# Patient Record
Sex: MALE | Race: OTHER
[De-identification: names, ages, dates, MRNs, and addresses within clinical notes are randomized per-mention and may not be internally consistent; named-entity substitution may affect disease eponyms.]

---

## 2019-10-17 ENCOUNTER — HOSPITAL ENCOUNTER (INPATIENT)
Dept: HOSPITAL 74 - JER | Age: 44
LOS: 6 days | Discharge: HOME | DRG: 347 | End: 2019-10-23
Attending: NURSE PRACTITIONER | Admitting: INTERNAL MEDICINE
Payer: COMMERCIAL

## 2019-10-17 VITALS — BODY MASS INDEX: 34.3 KG/M2

## 2019-10-17 DIAGNOSIS — R26.2: ICD-10-CM

## 2019-10-17 DIAGNOSIS — R73.9: ICD-10-CM

## 2019-10-17 DIAGNOSIS — D72.829: ICD-10-CM

## 2019-10-17 DIAGNOSIS — M54.5: ICD-10-CM

## 2019-10-17 DIAGNOSIS — M54.16: Primary | ICD-10-CM

## 2019-10-17 DIAGNOSIS — G62.9: ICD-10-CM

## 2019-10-17 LAB
ALBUMIN SERPL-MCNC: 3.8 G/DL (ref 3.4–5)
ALP SERPL-CCNC: 79 U/L (ref 45–117)
ALT SERPL-CCNC: 56 U/L (ref 13–61)
ANION GAP SERPL CALC-SCNC: 8 MMOL/L (ref 8–16)
APPEARANCE UR: CLEAR
AST SERPL-CCNC: 23 U/L (ref 15–37)
BASOPHILS # BLD: 0.3 % (ref 0–2)
BILIRUB SERPL-MCNC: 1.1 MG/DL (ref 0.2–1)
BILIRUB UR STRIP.AUTO-MCNC: NEGATIVE MG/DL
BUN SERPL-MCNC: 20.2 MG/DL (ref 7–18)
CALCIUM SERPL-MCNC: 9 MG/DL (ref 8.5–10.1)
CHLORIDE SERPL-SCNC: 100 MMOL/L (ref 98–107)
CO2 SERPL-SCNC: 28 MMOL/L (ref 21–32)
COLOR UR: YELLOW
CREAT SERPL-MCNC: 1.1 MG/DL (ref 0.55–1.3)
DEPRECATED RDW RBC AUTO: 13.1 % (ref 11.9–15.9)
EOSINOPHIL # BLD: 0.5 % (ref 0–4.5)
GLUCOSE SERPL-MCNC: 167 MG/DL (ref 74–106)
HCT VFR BLD CALC: 42.7 % (ref 35.4–49)
HGB BLD-MCNC: 14.9 GM/DL (ref 11.7–16.9)
KETONES UR QL STRIP: NEGATIVE
LEUKOCYTE ESTERASE UR QL STRIP.AUTO: NEGATIVE
LYMPHOCYTES # BLD: 19.4 % (ref 8–40)
MCH RBC QN AUTO: 30.9 PG (ref 25.7–33.7)
MCHC RBC AUTO-ENTMCNC: 35 G/DL (ref 32–35.9)
MCV RBC: 88.4 FL (ref 80–96)
MONOCYTES # BLD AUTO: 5.6 % (ref 3.8–10.2)
NEUTROPHILS # BLD: 74.2 % (ref 42.8–82.8)
NITRITE UR QL STRIP: NEGATIVE
PH UR: 7.5 [PH] (ref 5–8)
PLATELET # BLD AUTO: 307 K/MM3 (ref 134–434)
PMV BLD: 8.6 FL (ref 7.5–11.1)
POTASSIUM SERPLBLD-SCNC: 3.9 MMOL/L (ref 3.5–5.1)
PROT SERPL-MCNC: 7.4 G/DL (ref 6.4–8.2)
PROT UR QL STRIP: NEGATIVE
PROT UR QL STRIP: NEGATIVE
RBC # BLD AUTO: 4.83 M/MM3 (ref 4–5.6)
SODIUM SERPL-SCNC: 135 MMOL/L (ref 136–145)
SP GR UR: 1.01 (ref 1.01–1.03)
UROBILINOGEN UR STRIP-MCNC: 0.2 MG/DL (ref 0.2–1)
WBC # BLD AUTO: 12.2 K/MM3 (ref 4–10)

## 2019-10-17 RX ADMIN — KETOROLAC TROMETHAMINE PRN MG: 15 INJECTION, SOLUTION INTRAMUSCULAR; INTRAVENOUS at 16:19

## 2019-10-17 RX ADMIN — DOCUSATE SODIUM SCH MG: 100 CAPSULE, LIQUID FILLED ORAL at 21:46

## 2019-10-17 RX ADMIN — POLYETHYLENE GLYCOL 3350 SCH GRAMS: 17 POWDER, FOR SOLUTION ORAL at 19:01

## 2019-10-17 RX ADMIN — GABAPENTIN SCH MG: 100 CAPSULE ORAL at 21:46

## 2019-10-17 RX ADMIN — HEPARIN SODIUM SCH UNIT: 5000 INJECTION, SOLUTION INTRAVENOUS; SUBCUTANEOUS at 21:46

## 2019-10-17 RX ADMIN — ACETAMINOPHEN PRN MG: 325 TABLET ORAL at 20:18

## 2019-10-17 RX ADMIN — KETOROLAC TROMETHAMINE PRN MG: 15 INJECTION, SOLUTION INTRAMUSCULAR; INTRAVENOUS at 23:21

## 2019-10-17 RX ADMIN — GABAPENTIN SCH MG: 100 CAPSULE ORAL at 16:19

## 2019-10-17 NOTE — HP
CHIEF COMPLAINT: left leg pain, numbness and tingling and inability to 

ambulate.  also severe left leg pain





PCP:  no pcp





HISTORY OF PRESENT ILLNESS:





Patient is a 43 year old  male with a past medical medical history of back pain

, lumbar radiculopathy and appendectomy.  He is on no home medications.  he 

comes to the ED today with complaints of inability to ambulate or work 

secondary to worsening back and left leg pain.  He reports that in the past he 

has seen by a spine specialist and was told that he may need surgical 

interventions but has not yet followed up.  He works in construction and 

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 

mild left low back pain last week.  Patient went to [facility] and prescribed Percocet 

and Medrol Dosepak, but only took the percocet with minimal relief.   He did 

not want to take the steriods because he was unsure how they would help him.  


He denies any trauma or direct injury.  He states that he is unable to walk and 

is only comfortable when he lays on his right side. 





He denies fever, chills, chest pain, constipation or bowel and bladder 

incontinence.





ER course was notable for:


(1) wbc 12.2


(2) unable to move L leg without significant pain.


(3) Lumbar X ray without acute fracture.


(4) 





Recent Travel: none





PAST MEDICAL/Surgical HISTORY:  back pain, lumbar radiculopathy and appendectomy





Social History:


Smoking: denies


Alcohol: denies


Drugs: denies





Allergies





No Known Allergies Allergy (Verified 10/17/19 07:43)


 





PHYSICAL EXAMINATION


 Vital Signs - 24 hr











  10/17/19





  07:26


 


Temperature 98 F


 


Pulse Rate 74


 


Respiratory 16





Rate 


 


Blood Pressure 126/90


 


O2 Sat by Pulse 100





Oximetry (%) 











GENERAL: Awake, alert, and fully oriented, in no acute distress.


HEAD: Normal with no signs of trauma.


EYES: Pupils equal, round and reactive to light, extraocular movements intact, 

sclera anicteric, conjunctiva clear. No lid lag.


EARS, NOSE, THROAT: Ears normal, nares patent, oropharynx clear without 

exudates. Moist mucous membranes.


NECK: Normal range of motion, supple without lymphadenopathy, JVD, or masses.


LUNGS: Breath sounds equal, clear to auscultation bilaterally. No wheezes, and 

no crackles. No accessory muscle use.


HEART: Regular rate and rhythm, normal S1 and S2 without murmur, rub or gallop.


ABDOMEN: Soft, nontender, not distended, normoactive bowel sounds, no guarding, 

no rebound, no masses.  No hepatomegaly or  splenomegaly. 


MUSCULOSKELETAL: Normal range of motion at all joints. No bony deformities or 

tenderness. No CVA tenderness.


UPPER EXTREMITIES: 2+ pulses, warm, well-perfused. No cyanosis. No clubbing. 


LOWER EXTREMITIES: unable to move left leg without significant pain.  mild 

edema of left foot. 


NEUROLOGICAL:   Normal speech. unable to ambulate 2/2 left leg pain


PSYCHIATRIC: Cooperative. Good eye contact. Appropriate mood and affect.


 Laboratory Results - last 24 hr











  10/17/19 10/17/19





  10:39 10:39


 


WBC  12.2 H 


 


RBC  4.83 


 


Hgb  14.9 


 


Hct  42.7 


 


MCV  88.4 


 


MCH  30.9 


 


MCHC  35.0 


 


RDW  13.1 


 


Plt Count  307 


 


MPV  8.6 


 


Absolute Neuts (auto)  9.1 H 


 


Neutrophils %  74.2 


 


Lymphocytes %  19.4 


 


Monocytes %  5.6 


 


Eosinophils %  0.5 


 


Basophils %  0.3 


 


Nucleated RBC %  0 


 


Sodium   135 L


 


Potassium   3.9


 


Chloride   100


 


Carbon Dioxide   28


 


Anion Gap   8


 


BUN   20.2 H


 


Creatinine   1.1


 


Est GFR (CKD-EPI)AfAm   94.79


 


Est GFR (CKD-EPI)NonAf   81.78


 


Random Glucose   167 H


 


Calcium   9.0


 


Total Bilirubin   1.1 H


 


AST   23


 


ALT   56


 


Alkaline Phosphatase   79


 


Total Protein   7.4


 


Albumin   3.8











ASSESSMENT/PLAN:








Problem List





- Problem


(1) Ambulatory dysfunction


Assessment/Plan: 


Lumbar X ray without acute fracture


will order physical therapy


manage pain with lidocaine patches, gabapentin, tylenol and ketolorac injections


neurosx consulted


maintain fall risk precautions.  


heparin bid





Code(s): R26.2 - DIFFICULTY IN WALKING, NOT ELSEWHERE CLASSIFIED   





(2) Hyperglycemia


Assessment/Plan: 


elevated serum glucose


a1c in am


Code(s): R73.9 - HYPERGLYCEMIA, UNSPECIFIED   





(3) Neuropathy


Assessment/Plan: 


start on gabapentin, pain management


Code(s): G62.9 - POLYNEUROPATHY, UNSPECIFIED   





(4) Leukocytosis


Assessment/Plan: 


leukocytosis likely reactive


monitor


Code(s): D72.829 - ELEVATED WHITE BLOOD CELL COUNT, UNSPECIFIED   





(5) Prophylactic measure


Assessment/Plan: 


fen


tolerates po


monitor electrolytes





full code





heparin bid








Code(s): Z29.9 - ENCOUNTER FOR PROPHYLACTIC MEASURES, UNSPECIFIED   





Visit type





- Emergency Visit


Emergency Visit: Yes


ED Registration Date: 10/17/19


Care time: The patient presented to the Emergency Department on the above date 

and was hospitalized for further evaluation of their emergent condition.





- New Patient


This patient is new to me today: Yes


Date on this admission: 10/18/19





- Critical Care


Critical Care patient: No

## 2019-10-17 NOTE — PDOC
History of Present Illness





- General


Chief Complaint: Back Pain


Stated Complaint: BACK PAIN


Time Seen by Provider: 10/17/19 07:41


Exam Limitations: Language Barrier (Chadian speaking only)





- History of Present Illness


Initial Comments: 


10/17/19 08:08


44 yo M PMH appendicitis 3 years ago s/p appendectomy, sciatica diagnosed 4 

years ago, coming in with L lower back pain shooting down his L leg. Primarily 

Ch[place] speaking, little English. States that it feels like his normal sciatica 

pain but much more intense for the last 3 days, with 3 days of worsening pain 

before that. Apparently lifted some concrete 6 days ago. Went to [facility] 

yesterday where they have him Percocet and methylprednisolone. Patient states 

that he took the Percocet and it did not help at all, however he was worried 

the steroids were "like the ones for muscle building" and did not understand 

why they were prescribed, so he did not take them. Reports pain all the way in 

his toes, and can't move any part of his L leg without triggering terrible 

pain. Has been sleeping on his R side.





Denies fevers/chills, constipation/diarrhea, urinary issues, IV drug use, 

weakness, loss of sensation. Endorses tingling and burning pain in his L toes.








Past History





- Past Medical History


Allergies/Adverse Reactions: 


 Allergies











Allergy/AdvReac Type Severity Reaction Status Date / Time


 


No Known Allergies Allergy   Verified 10/17/19 07:43











COPD: No





- Psycho Social/Smoking Cessation Hx


Smoking History: Never smoked


Have you smoked in the past 12 months: No


Information on smoking cessation initiated: No


Hx Alcohol Use: No


Drug/Substance Use Hx: No





**Review of Systems





- Review of Systems


Constitutional: No: Chills, Diaphoresis, Fever


HEENTM: No: Recent change in vision, Throat Pain, Mouth Swelling


Respiratory: No: Cough, Shortness of Breath


Cardiac (ROS): No: Chest Pain, Edema, Irregular Heart Rate, Lightheadedness, 

Palpitations, Syncope, Chest Tightness


ABD/GI: No: Blood Streaked Bowels, Constipated, Diarrhea, Nausea, Rectal 

Bleeding, Vomiting


: No: Burning, Dysuria, Discharge, Frequency, Flank Pain, Hematuria, 

Incontinence


Musculoskeletal: Yes: Back Pain (L lower back, shooting down L leg).  No: Neck 

Pain


Neurological: Yes: Paresthesia (L toes).  No: Headache, Weakness, Dizziness





*Physical Exam





- Vital Signs


 Last Vital Signs











Temp Pulse Resp BP Pulse Ox


 


 98 F   74   16   126/90   100 


 


 10/17/19 07:26  10/17/19 07:26  10/17/19 07:26  10/17/19 07:26  10/17/19 07:26














- Physical Exam


Comments: 


10/17/19 08:27


Gen: well-developed, well-nourished, appears to be in distress


Neuro: AAOX4, CN II-XII intact, FTN intact, EOMI, PERRLA, sensation intact in 

all extremities. Significant pain with passive movement of any part of L leg, 

through to the toes


HEENT: atraumatic, normocephalic, dry mucous membranes


Neck: trachea midline, supple


CV: regular rate, regular rhythm, no murmurs, rubs, or gallops


Pulm: CTA b/l, no wheezing


Abd: soft, non-distended, mild ttp in RLQ


MSK: not moving L leg 2/2 pain, significant tenderness in L gluteus, intact 

pulses


Extr: no edema, no deformities


Skin: warm, dry








ED Treatment Course





- LABORATORY


CBC & Chemistry Diagram: 


 10/17/19 10:39





 10/17/19 10:39





Medical Decision Making





- Medical Decision Making


10/17/19 08:30


Concern for worsened sciatica v muscle spasm.


- have patient take prescribed steroid


- toradol 30mg IM


- lidocaine patch


- reassess





10/17/19 09:42


Patient reassessed, pain improved, however, still unable to move L leg without 

significant pain.





10/17/19 10:09


Lumbar X ray without acute fracture.





10/17/19 11:36


Na 135 and WBC 12.2, otherwise unremarkable.





10/17/19 12:20


Spoke with hospitalist, patient will be admitted.








Discharge





- Discharge Information


Problems reviewed: Yes


Clinical Impression/Diagnosis: 


 Neuropathy








- Follow up/Referral





- Patient Discharge Instructions





- Post Discharge Activity

## 2019-10-17 NOTE — PDOC
Attending Attestation





- Resident


Resident Name: Adolfo Kelly





- ED Attending Attestation


I have performed the following: I have examined & evaluated the patient, The 

case was reviewed & discussed with the resident, I agree w/resident's findings 

& plan





- HPI


HPI: 





10/17/19 09:42


43-year-old male with history of lumbar radiculopathy recurring intermittently 

in the past, seen by spine specialist and recommended for surgery but never had 

any interventions, presents now for persistent and worsening left low back pain 

with left leg radiation and paresthesias for the last week.  Patient states he 

did some heavy lifting last week, started having some gradual onset and mild 

left low back pain about 6 days ago, was seen at outside urgent care and 

prescribed Percocet and Medrol Dosepak, minimal and temporary relief with 

Percocet but not taking the steroids, presents now for worsening symptoms and 

pain, inability to stand or ambulate since yesterday.  No bowel or bladder 

issues, no direct injury, no fevers or chills.





- Physicial Exam


PE: 





10/17/19 09:44


Vital signs normal


Alert, seated in stretcher, mild distress secondary to left low back pain


Heart and lungs are clear, abdomen benign


No midline spine tenderness, some reproducible tenderness to the left low back 

without swelling or bruising


4 out of 5 strength of the left hip and knee, question limited by pain.  5 out 

of 5 plantar and dorsiflexion of the left foot.  Sensation decreased distally, 

neurovascularly intact otherwise.





- Medical Decision Making





10/17/19 09:45


43-year-old male with exacerbation of likely acute on chronic left lumbar 

radiculopathy, positive associated paresthesias and weakness, possibly pain 

related versus compression.  No evidence of cord compression, no direct injury, 

no fevers or chills.


Spine imaging


Pain control


We will need to reassess, if patient remains unable to ambulate and with 

decreased strength, may need admission for more acute evaluation with MRI and 

intervention.


10/17/19 11:43


minimal relief after meds, unable to stand/walk. Will proceed with admission, 

pain management and spine consults.

## 2019-10-18 LAB
ALBUMIN SERPL-MCNC: 3.6 G/DL (ref 3.4–5)
ALP SERPL-CCNC: 72 U/L (ref 45–117)
ALT SERPL-CCNC: 66 U/L (ref 13–61)
ANION GAP SERPL CALC-SCNC: 7 MMOL/L (ref 8–16)
ANISOCYTOSIS BLD QL: 0
AST SERPL-CCNC: 20 U/L (ref 15–37)
BASOPHILS # BLD: 0.4 % (ref 0–2)
BILIRUB SERPL-MCNC: 1.1 MG/DL (ref 0.2–1)
BUN SERPL-MCNC: 21.9 MG/DL (ref 7–18)
CALCIUM SERPL-MCNC: 8.9 MG/DL (ref 8.5–10.1)
CHLORIDE SERPL-SCNC: 101 MMOL/L (ref 98–107)
CO2 SERPL-SCNC: 29 MMOL/L (ref 21–32)
CREAT SERPL-MCNC: 1.1 MG/DL (ref 0.55–1.3)
DEPRECATED RDW RBC AUTO: 13 % (ref 11.9–15.9)
EOSINOPHIL # BLD: 1.5 % (ref 0–4.5)
GLUCOSE SERPL-MCNC: 90 MG/DL (ref 74–106)
HCT VFR BLD CALC: 41.8 % (ref 35.4–49)
HGB BLD-MCNC: 14.7 GM/DL (ref 11.7–16.9)
LYMPHOCYTES # BLD: 36.2 % (ref 8–40)
MACROCYTES BLD QL: 0
MAGNESIUM SERPL-MCNC: 2.4 MG/DL (ref 1.8–2.4)
MCH RBC QN AUTO: 31 PG (ref 25.7–33.7)
MCHC RBC AUTO-ENTMCNC: 35.1 G/DL (ref 32–35.9)
MCV RBC: 88.2 FL (ref 80–96)
MONOCYTES # BLD AUTO: 7.9 % (ref 3.8–10.2)
NEUTROPHILS # BLD: 54 % (ref 42.8–82.8)
PLATELET # BLD AUTO: 268 K/MM3 (ref 134–434)
PLATELET BLD QL SMEAR: NORMAL
PMV BLD: 8.7 FL (ref 7.5–11.1)
POTASSIUM SERPLBLD-SCNC: 4.3 MMOL/L (ref 3.5–5.1)
PROT SERPL-MCNC: 7 G/DL (ref 6.4–8.2)
RBC # BLD AUTO: 4.75 M/MM3 (ref 4–5.6)
SODIUM SERPL-SCNC: 137 MMOL/L (ref 136–145)
WBC # BLD AUTO: 8.7 K/MM3 (ref 4–10)

## 2019-10-18 RX ADMIN — DOCUSATE SODIUM SCH MG: 100 CAPSULE, LIQUID FILLED ORAL at 21:50

## 2019-10-18 RX ADMIN — GABAPENTIN SCH MG: 100 CAPSULE ORAL at 06:17

## 2019-10-18 RX ADMIN — DOCUSATE SODIUM SCH MG: 100 CAPSULE, LIQUID FILLED ORAL at 15:01

## 2019-10-18 RX ADMIN — POLYETHYLENE GLYCOL 3350 SCH GRAMS: 17 POWDER, FOR SOLUTION ORAL at 10:45

## 2019-10-18 RX ADMIN — ACETAMINOPHEN PRN MG: 325 TABLET ORAL at 03:21

## 2019-10-18 RX ADMIN — CYCLOBENZAPRINE HYDROCHLORIDE SCH MG: 5 TABLET, FILM COATED ORAL at 15:01

## 2019-10-18 RX ADMIN — HEPARIN SODIUM SCH UNIT: 5000 INJECTION, SOLUTION INTRAVENOUS; SUBCUTANEOUS at 21:50

## 2019-10-18 RX ADMIN — GABAPENTIN SCH MG: 100 CAPSULE ORAL at 15:01

## 2019-10-18 RX ADMIN — HEPARIN SODIUM SCH UNIT: 5000 INJECTION, SOLUTION INTRAVENOUS; SUBCUTANEOUS at 10:40

## 2019-10-18 RX ADMIN — DOCUSATE SODIUM SCH MG: 100 CAPSULE, LIQUID FILLED ORAL at 06:16

## 2019-10-18 RX ADMIN — KETOROLAC TROMETHAMINE PRN MG: 15 INJECTION, SOLUTION INTRAMUSCULAR; INTRAVENOUS at 05:28

## 2019-10-18 RX ADMIN — ACETAMINOPHEN PRN MG: 325 TABLET ORAL at 10:41

## 2019-10-18 RX ADMIN — ACETAMINOPHEN PRN MG: 325 TABLET ORAL at 22:02

## 2019-10-18 RX ADMIN — LIDOCAINE SCH PATCH: 50 PATCH TOPICAL at 10:40

## 2019-10-18 RX ADMIN — CYCLOBENZAPRINE HYDROCHLORIDE SCH MG: 5 TABLET, FILM COATED ORAL at 21:50

## 2019-10-18 RX ADMIN — ACETAMINOPHEN PRN MG: 325 TABLET ORAL at 16:52

## 2019-10-18 NOTE — CONSULT
Consult - text type





- Consultation


Consultation Note: 





NEUROSURGERY CONSULTATION





Jeevan Carter is a 43 year old Latin male with a long history of back and leg 

pains who manifested an acute exacerbation last week without antecedent 

accident or injury. Over a 4 day period, he had progressively severe back and 

Left lower extremity radicular pain which has not responded to analgesics and 

rest. He was seen and imaged at [facility] 3 days ago and was 

offered some type of surgical procedure. He presented to the [facility] ER last 

evening with worsening pain and limitation on his ambulation. He was admitted 

and plain film radiographs were obtained which were generally unremarkable. The 

patient has significant aggravation from Valsalva's maneuver and from any 

vibration or jostling such as riding in a car over a bump, rail road tracks or 

pot holes. The patient will ask his family to bring in his films. He has no 

bowel or bladder dysfunction.





Neurosurgical decision making will need to await review of his MRI

## 2019-10-18 NOTE — PN
Physical Exam: 


SUBJECTIVE: Patient seen and examined at the bedside.   reports pain is severe 

and pain medications wear out very quickly.  Could not sleep last night.  He 

reports when he went to [facility] they only did xrays not an MRI.





OBJECTIVE:





started on oxycodone 5 overnight for uncontrolled pain.





Patient is a 43 year old  male with a past medical medical history of back pain

, lumbar radiculopathy and appendectomy.  He is on no home medications.  he 

comes to the ED with complaints of inability to ambulate or work secondary to 

worsening back and left leg pain.  He reports that in the past he has seen by a 

spine specialist and was told that he may need surgical interventions but has 

not yet followed up.  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 

last week when he started to have  gradual onset and mild left low back pain 

last week.  Patient went to [facility] and prescribed Percocet and Medrol Dosepak, but 

only took the percocet with minimal relief.   He did not want to take the 

steriods because he was unsure how they would help him.  


He denies any trauma or direct injury.  He states that he is unable to walk and 

is only comfortable when he lays on his right side. 





 Vital Signs











 Period  Temp  Pulse  Resp  BP Sys/Bravo  Pulse Ox


 


 Last 24 Hr  97.7 F-98.4 F  76-96  16-20  102-134/58-93  100-100








GENERAL: Awake, alert, and fully oriented, in no acute distress.


HEAD: Normal with no signs of trauma.


EYES: Pupils equal, round and reactive to light, extraocular movements intact, 

sclera anicteric, conjunctiva clear. No lid lag.


EARS, NOSE, THROAT: Ears normal, nares patent, oropharynx clear without 

exudates. Moist mucous membranes.


NECK: Normal range of motion, supple without lymphadenopathy, JVD, or masses.


LUNGS: Breath sounds equal, clear to auscultation bilaterally. No wheezes, and 

no crackles. No accessory muscle use.


HEART: Regular rate and rhythm, normal S1 and S2 without murmur, rub or gallop.


ABDOMEN: Soft, nontender, not distended, normoactive bowel sounds, no guarding, 

no rebound, no masses.  No hepatomegaly or  splenomegaly. 


MUSCULOSKELETAL: Normal range of motion at all joints. No bony deformities or 

tenderness. No CVA tenderness.


UPPER EXTREMITIES: 2+ pulses, warm, well-perfused. No cyanosis. No clubbing. 


LOWER EXTREMITIES: unable to move left leg without significant pain.  mild 

edema of left foot. 


NEUROLOGICAL:   Normal speech. unable to ambulate 2/2 left leg pain


PSYCHIATRIC: Cooperative. Good eye contact. Appropriate mood and affect.








 Laboratory Results - last 24 hr











  10/17/19 10/17/19 10/17/19





  10:39 10:39 16:00


 


WBC  12.2 H  


 


RBC  4.83  


 


Hgb  14.9  


 


Hct  42.7  


 


MCV  88.4  


 


MCH  30.9  


 


MCHC  35.0  


 


RDW  13.1  


 


Plt Count  307  


 


MPV  8.6  


 


Absolute Neuts (auto)  9.1 H  


 


Neutrophils %  74.2  


 


Lymphocytes %  19.4  


 


Monocytes %  5.6  


 


Eosinophils %  0.5  


 


Basophils %  0.3  


 


Nucleated RBC %  0  


 


Sodium   135 L 


 


Potassium   3.9 


 


Chloride   100 


 


Carbon Dioxide   28 


 


Anion Gap   8 


 


BUN   20.2 H 


 


Creatinine   1.1 


 


Est GFR (CKD-EPI)AfAm   94.79 


 


Est GFR (CKD-EPI)NonAf   81.78 


 


Random Glucose   167 H 


 


Calcium   9.0 


 


Total Bilirubin   1.1 H 


 


AST   23 


 


ALT   56 


 


Alkaline Phosphatase   79 


 


Total Protein   7.4 


 


Albumin   3.8 


 


Urine Color    Yellow


 


Urine Appearance    Clear


 


Urine pH    7.5


 


Ur Specific Gravity    1.008 L


 


Urine Protein    Negative


 


Urine Glucose (UA)    Negative


 


Urine Ketones    Negative


 


Urine Blood    Negative


 


Urine Nitrite    Negative


 


Urine Bilirubin    Negative


 


Urine Urobilinogen    0.2


 


Ur Leukocyte Esterase    Negative














  10/18/19





  06:30


 


WBC  8.7


 


RBC  4.75


 


Hgb  14.7


 


Hct  41.8


 


MCV  88.2


 


MCH  31.0


 


MCHC  35.1


 


RDW  13.0


 


Plt Count  268


 


MPV  8.7


 


Absolute Neuts (auto)  4.7


 


Neutrophils %  54.0  D


 


Lymphocytes %  36.2  D


 


Monocytes %  7.9


 


Eosinophils %  1.5  D


 


Basophils %  0.4


 


Nucleated RBC %  0


 


Sodium 


 


Potassium 


 


Chloride 


 


Carbon Dioxide 


 


Anion Gap 


 


BUN 


 


Creatinine 


 


Est GFR (CKD-EPI)AfAm 


 


Est GFR (CKD-EPI)NonAf 


 


Random Glucose 


 


Calcium 


 


Total Bilirubin 


 


AST 


 


ALT 


 


Alkaline Phosphatase 


 


Total Protein 


 


Albumin 


 


Urine Color 


 


Urine Appearance 


 


Urine pH 


 


Ur Specific Gravity 


 


Urine Protein 


 


Urine Glucose (UA) 


 


Urine Ketones 


 


Urine Blood 


 


Urine Nitrite 


 


Urine Bilirubin 


 


Urine Urobilinogen 


 


Ur Leukocyte Esterase 








Active Medications











Generic Name Dose Route Start Last Admin





  Trade Name Freq  PRN Reason Stop Dose Admin


 


Acetaminophen  325 mg  10/17/19 20:07  10/18/19 03:21





  Tylenol -  PO  10/20/19 20:06  325 mg





  Q4H PRN   Administration





  PAIN LEVEL 7 - 10   





     





     





     


 


Diazepam  5 mg  10/17/19 17:32  10/17/19 23:28





  Valium -  PO   5 mg





  Q24H PRN   Administration





  spasms   





     





     





     


 


Docusate Sodium  100 mg  10/17/19 22:00  10/18/19 06:16





  Colace -  PO   100 mg





  TID BRAYAN   Administration





     





     





     





     


 


Gabapentin  100 mg  10/17/19 15:45  10/18/19 06:17





  Neurontin -  PO   100 mg





  TID BRAYAN   Administration





     





     





     





     


 


Heparin Sodium (Porcine)  5,000 unit  10/17/19 22:00  10/17/19 21:46





  Heparin -  SQ   5,000 unit





  BID [facility]   Administration





     





     





     





     


 


Ketorolac Tromethamine  15 mg  10/17/19 15:39  10/18/19 05:28





  Toradol Injection -  IVPUSH  10/22/19 15:38  15 mg





  Q6H PRN   Administration





  PAIN LEVEL 4 - 6   





     





     





     


 


Lidocaine  1 patch  10/18/19 10:00  





  Lidoderm Patch -  TP   





  DAILY [facility]   





     





     





     





     


 


Miscellaneous  1 each  10/18/19 22:00  





  Lidoderm Patch Removal  MC   





  DAILY@2200 [facility]   





     





     





     





     


 


Oxycodone HCl  5 mg  10/17/19 20:07  10/18/19 03:19





  Roxicodone -  PO   5 mg





  Q4H PRN   Administration





  PAIN LEVEL 7 - 10   





     





     





     


 


Polyethylene Glycol  17 gm  10/17/19 16:15  10/17/19 19:01





  Miralax (For Daily Use) -  PO   17 grams





  DAILY BRAYAN   Administration





     





     





     





     











ASSESSMENT/PLAN:








Problem List





- Problems


(1) Ambulatory dysfunction


Assessment/Plan: 


Lumbar X ray without acute fracture.  Will order lumbar spine MRI.


physical therapy ordered


manage pain with lidocaine patches, gabapentin, flexiril and oxycodone prn


neurosx consulted


maintain fall risk precautions.  


heparin bid





Code(s): R26.2 - DIFFICULTY IN WALKING, NOT ELSEWHERE CLASSIFIED   





(2) Hyperglycemia


Assessment/Plan: 


elevated serum glucose


a1c pending


Code(s): R73.9 - HYPERGLYCEMIA, UNSPECIFIED   





(3) Neuropathy


Assessment/Plan: 


on gabapentin, pain management


Code(s): G62.9 - POLYNEUROPATHY, UNSPECIFIED   





(4) Leukocytosis


Assessment/Plan: 


leukocytosis likely reactive


monitor


Code(s): D72.829 - ELEVATED WHITE BLOOD CELL COUNT, UNSPECIFIED   





(5) Prophylactic measure


Assessment/Plan: 


fen


tolerates po


monitor electrolytes





full code





heparin bid








Code(s): Z29.9 - ENCOUNTER FOR PROPHYLACTIC MEASURES, UNSPECIFIED   





Visit type





- Emergency Visit


Emergency Visit: Yes


ED Registration Date: 10/17/19


Care time: The patient presented to the Emergency Department on the above date 

and was hospitalized for further evaluation of their emergent condition.





- New Patient


This patient is new to me today: No





- Critical Care


Critical Care patient: No





- Discharge Referral


Referred to [facility] No

## 2019-10-19 LAB
ALBUMIN SERPL-MCNC: 3.6 G/DL (ref 3.4–5)
ALP SERPL-CCNC: 78 U/L (ref 45–117)
ALT SERPL-CCNC: 78 U/L (ref 13–61)
ANION GAP SERPL CALC-SCNC: 8 MMOL/L (ref 8–16)
AST SERPL-CCNC: 26 U/L (ref 15–37)
BASOPHILS # BLD: 0.5 % (ref 0–2)
BILIRUB SERPL-MCNC: 1.1 MG/DL (ref 0.2–1)
BUN SERPL-MCNC: 20.8 MG/DL (ref 7–18)
CALCIUM SERPL-MCNC: 8.9 MG/DL (ref 8.5–10.1)
CHLORIDE SERPL-SCNC: 98 MMOL/L (ref 98–107)
CO2 SERPL-SCNC: 31 MMOL/L (ref 21–32)
CREAT SERPL-MCNC: 1.2 MG/DL (ref 0.55–1.3)
DEPRECATED RDW RBC AUTO: 13.1 % (ref 11.9–15.9)
EOSINOPHIL # BLD: 2.3 % (ref 0–4.5)
GLUCOSE SERPL-MCNC: 106 MG/DL (ref 74–106)
HCT VFR BLD CALC: 42.1 % (ref 35.4–49)
HGB BLD-MCNC: 15 GM/DL (ref 11.7–16.9)
LYMPHOCYTES # BLD: 24.2 % (ref 8–40)
MAGNESIUM SERPL-MCNC: 2.3 MG/DL (ref 1.8–2.4)
MCH RBC QN AUTO: 31.5 PG (ref 25.7–33.7)
MCHC RBC AUTO-ENTMCNC: 35.6 G/DL (ref 32–35.9)
MCV RBC: 88.6 FL (ref 80–96)
MONOCYTES # BLD AUTO: 9 % (ref 3.8–10.2)
NEUTROPHILS # BLD: 64 % (ref 42.8–82.8)
PLATELET # BLD AUTO: 283 K/MM3 (ref 134–434)
PMV BLD: 8.4 FL (ref 7.5–11.1)
POTASSIUM SERPLBLD-SCNC: 4.4 MMOL/L (ref 3.5–5.1)
PROT SERPL-MCNC: 7.1 G/DL (ref 6.4–8.2)
RBC # BLD AUTO: 4.76 M/MM3 (ref 4–5.6)
SODIUM SERPL-SCNC: 136 MMOL/L (ref 136–145)
WBC # BLD AUTO: 9.6 K/MM3 (ref 4–10)

## 2019-10-19 RX ADMIN — GABAPENTIN SCH MG: 100 CAPSULE ORAL at 07:08

## 2019-10-19 RX ADMIN — DOCUSATE SODIUM SCH MG: 100 CAPSULE, LIQUID FILLED ORAL at 14:51

## 2019-10-19 RX ADMIN — LIDOCAINE SCH PATCH: 50 PATCH TOPICAL at 09:50

## 2019-10-19 RX ADMIN — CYCLOBENZAPRINE HYDROCHLORIDE SCH MG: 5 TABLET, FILM COATED ORAL at 07:09

## 2019-10-19 RX ADMIN — Medication SCH EACH: at 01:05

## 2019-10-19 RX ADMIN — HEPARIN SODIUM SCH UNIT: 5000 INJECTION, SOLUTION INTRAVENOUS; SUBCUTANEOUS at 21:13

## 2019-10-19 RX ADMIN — GABAPENTIN SCH MG: 100 CAPSULE ORAL at 14:50

## 2019-10-19 RX ADMIN — GABAPENTIN SCH MG: 100 CAPSULE ORAL at 21:13

## 2019-10-19 RX ADMIN — CYCLOBENZAPRINE HYDROCHLORIDE SCH MG: 5 TABLET, FILM COATED ORAL at 22:14

## 2019-10-19 RX ADMIN — GABAPENTIN SCH MG: 100 CAPSULE ORAL at 01:07

## 2019-10-19 RX ADMIN — ACETAMINOPHEN PRN MG: 325 TABLET ORAL at 12:50

## 2019-10-19 RX ADMIN — HEPARIN SODIUM SCH UNIT: 5000 INJECTION, SOLUTION INTRAVENOUS; SUBCUTANEOUS at 09:50

## 2019-10-19 RX ADMIN — Medication SCH EACH: at 22:56

## 2019-10-19 RX ADMIN — CYCLOBENZAPRINE HYDROCHLORIDE SCH MG: 5 TABLET, FILM COATED ORAL at 14:50

## 2019-10-19 RX ADMIN — SODIUM CHLORIDE SCH MLS/HR: 9 INJECTION, SOLUTION INTRAVENOUS at 09:50

## 2019-10-19 RX ADMIN — DOCUSATE SODIUM SCH MG: 100 CAPSULE, LIQUID FILLED ORAL at 21:13

## 2019-10-19 RX ADMIN — ACETAMINOPHEN PRN MG: 325 TABLET ORAL at 21:19

## 2019-10-19 RX ADMIN — POLYETHYLENE GLYCOL 3350 SCH GRAMS: 17 POWDER, FOR SOLUTION ORAL at 09:51

## 2019-10-19 RX ADMIN — DOCUSATE SODIUM SCH MG: 100 CAPSULE, LIQUID FILLED ORAL at 07:08

## 2019-10-19 NOTE — PN
Physical Exam: 


SUBJECTIVE: Patient seen and examined at the bedside.  reports improvement of 

moving his left leg.  pain being managed, but has not yet participated with PT.

  





OBJECTIVE:


Patient is a 43 year old  male with a past medical medical history of back pain

, lumbar radiculopathy and appendectomy.  He is on no home medications.  he 

comes to the ED with complaints of inability to ambulate or work secondary to 

worsening back and left leg pain.  He reports that in the past he has seen by a 

spine specialist and was told that he may need surgical interventions but has 

not yet followed up.  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 

last week when he started to have  gradual onset and mild left low back pain 

last week.  Patient went to [facility] and prescribed Percocet and Medrol Dosepak, but 

only took the percocet with minimal relief.   He did not want to take the 

steriods because he was unsure how they would help him.  


He denies any trauma or direct injury.  He states that he is unable to walk and 

is only comfortable when he lays on his right side. 





imaging:


lumbar spine MRI: (1) at l4-l5 there is a central and bilateral disc herniation 

larger on the right deforming the sac and appears to be reaching the right L5 

root (2) at l3-l4 there is a small central and slightly left sided disc 

herniation mildly deforming the sac (3) at L5-S1 there is a small central and 

slightly right sided disc herniation.


 Vital Signs











 Period  Temp  Pulse  Resp  BP Sys/Bravo  Pulse Ox


 


 Last 24 Hr  98.3 F-98.6 F    18-18  115-134/63-73  99








GENERAL: Awake, alert, and fully oriented, in no acute distress.


HEAD: Normal with no signs of trauma.


EYES: Pupils equal, round and reactive to light, extraocular movements intact, 

sclera anicteric, conjunctiva clear. No lid lag.


EARS, NOSE, THROAT: Ears normal, nares patent, oropharynx clear without 

exudates. Moist mucous membranes.


NECK: Normal range of motion, supple without lymphadenopathy, JVD, or masses.


LUNGS: Breath sounds equal, clear to auscultation bilaterally. No wheezes, and 

no crackles. No accessory muscle use.


HEART: Regular rate and rhythm, normal S1 and S2 without murmur, rub or gallop.


ABDOMEN: Soft, nontender, not distended, normoactive bowel sounds, no guarding, 

no rebound, no masses.  No hepatomegaly or  splenomegaly. 


MUSCULOSKELETAL: Normal range of motion at all joints. No bony deformities or 

tenderness. No CVA tenderness.


UPPER EXTREMITIES: 2+ pulses, warm, well-perfused. No cyanosis. No clubbing. 


LOWER EXTREMITIES: better movement of the left leg without significant pain.  

mild edema of left foot, no falls


NEUROLOGICAL:   Normal speech. unable to ambulate 2/2 left leg pain


PSYCHIATRIC: Cooperative. Good eye contact. Appropriate mood and affect.





 Laboratory Results - last 24 hr











  10/18/19 10/19/19 10/19/19





  06:30 07:00 07:00


 


WBC   9.6 


 


RBC   4.76 


 


Hgb   15.0 


 


Hct   42.1 


 


MCV   88.6 


 


MCH   31.5 


 


MCHC   35.6 


 


RDW   13.1 


 


Plt Count   283 


 


MPV   8.4 


 


Absolute Neuts (auto)   6.1 


 


Neutrophils %   64.0 


 


Lymphocytes %   24.2  D 


 


Monocytes %   9.0 


 


Eosinophils %   2.3 


 


Basophils %   0.5 


 


Nucleated RBC %   0 


 


Sodium    136


 


Potassium    4.4


 


Chloride    98


 


Carbon Dioxide    31


 


Anion Gap    8


 


BUN    20.8 H


 


Creatinine    1.2


 


Est GFR (CKD-EPI)AfAm    85.32


 


Est GFR (CKD-EPI)NonAf    73.62


 


Random Glucose    106


 


Hemoglobin A1c %  5.4  


 


Calcium    8.9


 


Magnesium    2.3


 


Total Bilirubin    1.1 H


 


AST    26


 


ALT    78 H


 


Alkaline Phosphatase    78


 


Total Protein    7.1


 


Albumin    3.6








Active Medications











Generic Name Dose Route Start Last Admin





  Trade Name Freq  PRN Reason Stop Dose Admin


 


Acetaminophen  325 mg  10/17/19 20:07  10/19/19 12:50





  Tylenol -  PO  10/20/19 20:06  325 mg





  Q4H PRN   Administration





  PAIN LEVEL 7 - 10   





     





     





     


 


Cyclobenzaprine HCl  5 mg  10/18/19 14:00  10/19/19 14:50





  Cyclobenzaprine Hcl  PO   5 mg





  TID BRAYAN   Administration





     





     





     





     


 


Diazepam  5 mg  10/17/19 17:32  10/17/19 23:28





  Valium -  PO   5 mg





  Q24H PRN   Administration





  spasms   





     





     





     


 


Docusate Sodium  100 mg  10/17/19 22:00  10/19/19 14:51





  Colace -  PO   100 mg





  TID BRAYAN   Administration





     





     





     





     


 


Gabapentin  100 mg  10/17/19 15:45  10/19/19 14:50





  Neurontin -  PO   100 mg





  TID BRAYAN   Administration





     





     





     





     


 


Heparin Sodium (Porcine)  5,000 unit  10/17/19 22:00  10/19/19 09:50





  Heparin -  SQ   5,000 unit





  BID BRAYAN   Administration





     





     





     





     


 


Sodium Chloride  1,000 mls @ 100 mls/hr  10/19/19 09:15  10/19/19 09:50





  Normal Saline -  IV   100 mls/hr





  ASDIR BRAYAN   Administration





     





     





     





     


 


Lidocaine  1 patch  10/18/19 10:00  10/19/19 09:50





  Lidoderm Patch -  TP   1 patch





  DAILY BRAYAN   Administration





     





     





     





     


 


Miscellaneous  1 each  10/18/19 22:00  10/19/19 01:05





  Lidoderm Patch Removal  MC   1 each





  DAILY@2200 BRAYAN   Administration





     





     





     





     


 


Oxycodone HCl  10 mg  10/18/19 09:21  10/19/19 12:50





  Roxicodone -  PO   10 mg





  Q4H PRN   Administration





  PAIN LEVEL 7 - 10   





     





     





     


 


Polyethylene Glycol  17 gm  10/17/19 16:15  10/19/19 09:51





  Miralax (For Daily Use) -  PO   17 grams





  DAILY BRAYAN   Administration





     





     





     





     











ASSESSMENT/PLAN:








Problem List





- Problems


(1) Ambulatory dysfunction


Assessment/Plan: 


Lumbar X ray without acute fracture.  


Lumbar spine with multiple disc herniations seen on imaging.  see above. 


physical therapy ordered, but unable to yet participate 2/2 to pain.  patient 

to be pre medicated prior to pt


manage pain with lidocaine patches, gabapentin, flexiril and oxycodone prn


neurosx consulted and following


maintain fall risk precautions.  


heparin bid


Code(s): R26.2 - DIFFICULTY IN WALKING, NOT ELSEWHERE CLASSIFIED   





(2) Hyperglycemia


Assessment/Plan: 


elevated serum glucose on admission, now normal, a1c at acceptable ranges.


Code(s): R73.9 - HYPERGLYCEMIA, UNSPECIFIED   





(3) Neuropathy


Assessment/Plan: 


on gabapentin, pain management


Code(s): G62.9 - POLYNEUROPATHY, UNSPECIFIED   





(4) Leukocytosis


Assessment/Plan: 


leukocytosis resolved.


monitor


Code(s): D72.829 - ELEVATED WHITE BLOOD CELL COUNT, UNSPECIFIED   





(5) Prophylactic measure


Assessment/Plan: 


fen


tolerates po


monitor electrolytes





full code





heparin bid








Code(s): Z29.9 - ENCOUNTER FOR PROPHYLACTIC MEASURES, UNSPECIFIED   





Visit type





- Emergency Visit


Emergency Visit: Yes


ED Registration Date: 10/17/19


Care time: The patient presented to the Emergency Department on the above date 

and was hospitalized for further evaluation of their emergent condition.





- New Patient


This patient is new to me today: No





- Critical Care


Critical Care patient: No





- Discharge Referral


Referred to [facility] No

## 2019-10-20 LAB
ALBUMIN SERPL-MCNC: 3.7 G/DL (ref 3.4–5)
ALP SERPL-CCNC: 90 U/L (ref 45–117)
ALT SERPL-CCNC: 113 U/L (ref 13–61)
ANION GAP SERPL CALC-SCNC: 5 MMOL/L (ref 8–16)
AST SERPL-CCNC: 41 U/L (ref 15–37)
BASOPHILS # BLD: 0.5 % (ref 0–2)
BILIRUB SERPL-MCNC: 1.5 MG/DL (ref 0.2–1)
BUN SERPL-MCNC: 14.4 MG/DL (ref 7–18)
CALCIUM SERPL-MCNC: 9.1 MG/DL (ref 8.5–10.1)
CHLORIDE SERPL-SCNC: 98 MMOL/L (ref 98–107)
CO2 SERPL-SCNC: 32 MMOL/L (ref 21–32)
CREAT SERPL-MCNC: 1.1 MG/DL (ref 0.55–1.3)
DEPRECATED RDW RBC AUTO: 13.1 % (ref 11.9–15.9)
EOSINOPHIL # BLD: 3.7 % (ref 0–4.5)
GLUCOSE SERPL-MCNC: 119 MG/DL (ref 74–106)
HCT VFR BLD CALC: 43 % (ref 35.4–49)
HGB BLD-MCNC: 15.2 GM/DL (ref 11.7–16.9)
LYMPHOCYTES # BLD: 37.9 % (ref 8–40)
MCH RBC QN AUTO: 30.8 PG (ref 25.7–33.7)
MCHC RBC AUTO-ENTMCNC: 35.4 G/DL (ref 32–35.9)
MCV RBC: 86.9 FL (ref 80–96)
MONOCYTES # BLD AUTO: 9 % (ref 3.8–10.2)
NEUTROPHILS # BLD: 48.9 % (ref 42.8–82.8)
PLATELET # BLD AUTO: 274 K/MM3 (ref 134–434)
PMV BLD: 7.8 FL (ref 7.5–11.1)
POTASSIUM SERPLBLD-SCNC: 4.4 MMOL/L (ref 3.5–5.1)
PROT SERPL-MCNC: 7.6 G/DL (ref 6.4–8.2)
RBC # BLD AUTO: 4.95 M/MM3 (ref 4–5.6)
SODIUM SERPL-SCNC: 136 MMOL/L (ref 136–145)
WBC # BLD AUTO: 6.1 K/MM3 (ref 4–10)

## 2019-10-20 RX ADMIN — SODIUM CHLORIDE SCH MLS/HR: 9 INJECTION, SOLUTION INTRAVENOUS at 09:48

## 2019-10-20 RX ADMIN — DOCUSATE SODIUM SCH MG: 100 CAPSULE, LIQUID FILLED ORAL at 21:20

## 2019-10-20 RX ADMIN — CYCLOBENZAPRINE HYDROCHLORIDE SCH MG: 5 TABLET, FILM COATED ORAL at 21:20

## 2019-10-20 RX ADMIN — GABAPENTIN SCH MG: 100 CAPSULE ORAL at 21:21

## 2019-10-20 RX ADMIN — ACETAMINOPHEN PRN MG: 325 TABLET ORAL at 15:11

## 2019-10-20 RX ADMIN — DOCUSATE SODIUM SCH MG: 100 CAPSULE, LIQUID FILLED ORAL at 15:11

## 2019-10-20 RX ADMIN — HEPARIN SODIUM SCH UNIT: 5000 INJECTION, SOLUTION INTRAVENOUS; SUBCUTANEOUS at 21:21

## 2019-10-20 RX ADMIN — ACETAMINOPHEN PRN MG: 325 TABLET ORAL at 02:49

## 2019-10-20 RX ADMIN — POLYETHYLENE GLYCOL 3350 SCH GRAMS: 17 POWDER, FOR SOLUTION ORAL at 09:54

## 2019-10-20 RX ADMIN — DOCUSATE SODIUM SCH MG: 100 CAPSULE, LIQUID FILLED ORAL at 05:15

## 2019-10-20 RX ADMIN — ACETAMINOPHEN PRN MG: 325 TABLET ORAL at 09:52

## 2019-10-20 RX ADMIN — LIDOCAINE SCH PATCH: 50 PATCH TOPICAL at 09:48

## 2019-10-20 RX ADMIN — HEPARIN SODIUM SCH UNIT: 5000 INJECTION, SOLUTION INTRAVENOUS; SUBCUTANEOUS at 09:48

## 2019-10-20 RX ADMIN — CYCLOBENZAPRINE HYDROCHLORIDE SCH MG: 5 TABLET, FILM COATED ORAL at 15:11

## 2019-10-20 RX ADMIN — GABAPENTIN SCH MG: 100 CAPSULE ORAL at 15:11

## 2019-10-20 RX ADMIN — CYCLOBENZAPRINE HYDROCHLORIDE SCH MG: 5 TABLET, FILM COATED ORAL at 05:15

## 2019-10-20 RX ADMIN — Medication SCH EACH: at 23:37

## 2019-10-20 RX ADMIN — GABAPENTIN SCH MG: 100 CAPSULE ORAL at 05:15

## 2019-10-20 NOTE — PN
Progress Note (short form)





- Note


Progress Note: 





Patient is making gradual improvement over the past 48 hours. He is in far less 

pain and can move and shift within the bed with greater ease. He still cannot 

walk, however, he reports substantial overall improvement. MRI reviewed which 

shows degenerative changes at L34, L45 and L5S1 with disc bulges and 

ligamentous hypertrophy which causes multilevel nerve root irritation. 

Pathology on MRI is not severe enough to mandate urgent surgical intervention. 

Indeed, the patient is not eager to undergo surgery. I explained the absolute 

indications for surgery being bowel and bladder dysfunction and fixed motor 

deficits and the relative indication of intractable pain despite all 

conservative efforts.





Given that the patient is improving and has neither bowel/bladder dysfunction 

nor fixed motor deficits, his desire to avoid surgery can/will be respected. 

Patient may benefit from epidural steroid injections and Physical Therapy. If 

patient's symptoms persist beyond 8-12 weeks or he develops bowel/bladder 

dysfunction or fixed motor deficits, we will re-evaluate the potential role for 

surgical intervention. At this time, there is no acute Neurosurgical 

intervention which is indicated or planned. This was described to the patient 

who was in agreement.

## 2019-10-20 NOTE — PN
Physical Exam: 


SUBJECTIVE: 


Patient seen and examined.  reports pain is more controlled. can lay in bed in 

better position, can be in wheelchair without pain.  still cannot walk, can 

only stand with physical therapy.





OBJECTIVE:


Patient is a 43 year old  male with a past medical medical history of back pain

, lumbar radiculopathy and appendectomy.  He is on no home medications.  he 

comes to the ED on 10/17/19 with complaints of inability to ambulate or work 

secondary to worsening back and left leg pain.  He reports that in the past he 

has seen by a spine specialist and was told that he may need surgical 

interventions but has not yet followed up.  He works in construction and 

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 

low back pain.  Patient went to [facility] and prescribed Percocet and Medrol Dosepak, 

but only took the percocet with minimal relief.   He denies any trauma or 

direct injury.  He states that he is unable to walk and is only comfortable 

when he lays on his right side. 





imaging:


lumbar spine MRI: (1) at l4-l5 there is a central and bilateral disc herniation 

larger on the right deforming the sac and appears to be reaching the right L5 

root (2) at l3-l4 there is a small central and slightly left sided disc 

herniation mildly deforming the sac (3) at L5-S1 there is a small central and 

slightly right sided disc herniation.


 Vital Signs











 Period  Temp  Pulse  Resp  BP Sys/Bravo  Pulse Ox


 


 Last 24 Hr  98.2 F-99.3 F    18-18  108-116/65-74  





GENERAL: Awake, alert, and fully oriented, in no acute distress.


HEAD: Normal with no signs of trauma.


EYES: Pupils equal, round and reactive to light, extraocular movements intact, 

sclera anicteric, conjunctiva clear. No lid lag.


EARS, NOSE, THROAT: Ears normal, nares patent, oropharynx clear without 

exudates. Moist mucous membranes.


NECK: Normal range of motion, supple without lymphadenopathy, JVD, or masses.


LUNGS: Breath sounds equal, clear to auscultation bilaterally. No wheezes, and 

no crackles. No accessory muscle use.


HEART: Regular rate and rhythm, normal S1 and S2 without murmur, rub or gallop.


ABDOMEN: Soft, nontender, not distended, normoactive bowel sounds, no guarding, 

no rebound, no masses.  No hepatomegaly or  splenomegaly. 


MUSCULOSKELETAL: Normal range of motion at all joints. No bony deformities or 

tenderness. No CVA tenderness.


UPPER EXTREMITIES: 2+ pulses, warm, well-perfused. No cyanosis. No clubbing. 


LOWER EXTREMITIES: better movement of the left leg without significant pain.  

mild edema of left foot, no falls, no fracture on foot xray


NEUROLOGICAL:   Normal speech. unable to ambulate 2/2 left leg pain, can stand 

with physical therapy.


PSYCHIATRIC: Cooperative. Good eye contact. Appropriate mood and affect.


 Laboratory Results - last 24 hr











  10/20/19 10/20/19





  08:40 08:40


 


WBC  6.1 


 


RBC  4.95 


 


Hgb  15.2 


 


Hct  43.0 


 


MCV  86.9 


 


MCH  30.8 


 


MCHC  35.4 


 


RDW  13.1 


 


Plt Count  274 


 


MPV  7.8 


 


Absolute Neuts (auto)  3.0 


 


Neutrophils %  48.9  D 


 


Lymphocytes %  37.9  D 


 


Monocytes %  9.0 


 


Eosinophils %  3.7 


 


Basophils %  0.5 


 


Nucleated RBC %  0 


 


Sodium   136


 


Potassium   4.4


 


Chloride   98


 


Carbon Dioxide   32


 


Anion Gap   5 L


 


BUN   14.4


 


Creatinine   1.1


 


Est GFR (CKD-EPI)AfAm   94.79


 


Est GFR (CKD-EPI)NonAf   81.78


 


Random Glucose   119 H


 


Calcium   9.1


 


Total Bilirubin   1.5 H


 


AST   41 H


 


ALT   113 H


 


Alkaline Phosphatase   90


 


Total Protein   7.6


 


Albumin   3.7








Active Medications











Generic Name Dose Route Start Last Admin





  Trade Name Freq  PRN Reason Stop Dose Admin


 


Acetaminophen  325 mg  10/17/19 20:07  10/20/19 09:52





  Tylenol -  PO  10/20/19 20:06  325 mg





  Q4H PRN   Administration





  PAIN LEVEL 7 - 10   





     





     





     


 


Cyclobenzaprine HCl  5 mg  10/18/19 14:00  10/20/19 05:15





  Cyclobenzaprine Hcl  PO   5 mg





  TID BRAYAN   Administration





     





     





     





     


 


Diazepam  5 mg  10/17/19 17:32  10/17/19 23:28





  Valium -  PO   5 mg





  Q24H PRN   Administration





  spasms   





     





     





     


 


Docusate Sodium  100 mg  10/17/19 22:00  10/20/19 05:15





  Colace -  PO   100 mg





  TID BRAYAN   Administration





     





     





     





     


 


Gabapentin  100 mg  10/17/19 15:45  10/20/19 05:15





  Neurontin -  PO   100 mg





  TID BRAYAN   Administration





     





     





     





     


 


Heparin Sodium (Porcine)  5,000 unit  10/17/19 22:00  10/20/19 09:48





  Heparin -  SQ   5,000 unit





  BID BRAYAN   Administration





     





     





     





     


 


Sodium Chloride  1,000 mls @ 100 mls/hr  10/19/19 09:15  10/20/19 09:48





  Normal Saline -  IV   100 mls/hr





  ASDIR BRAYAN   Administration





     





     





     





     


 


Lidocaine  1 patch  10/18/19 10:00  10/20/19 09:48





  Lidoderm Patch -  TP   1 patch





  DAILY BRAYAN   Administration





     





     





     





     


 


Miscellaneous  1 each  10/18/19 22:00  10/19/19 22:56





  Lidoderm Patch Removal  MC   1 each





  DAILY@2200 BRAYAN   Administration





     





     





     





     


 


Oxycodone HCl  10 mg  10/18/19 09:21  10/20/19 09:53





  Roxicodone -  PO   10 mg





  Q4H PRN   Administration





  PAIN LEVEL 7 - 10   





     





     





     


 


Polyethylene Glycol  17 gm  10/17/19 16:15  10/20/19 09:54





  Miralax (For Daily Use) -  PO   17 grams





  DAILY BRAYAN   Administration





     





     





     





     











ASSESSMENT/PLAN:








Problem List





- Problems


(1) Ambulatory dysfunction


Assessment/Plan: 


Lumbar X ray without acute fracture.  


Lumbar spine with multiple disc herniations seen on imaging.  see above. 


physical therapy ordered, but unable to yet participate 2/2 to pain.  patient 

to be pre medicated prior to pt


manage pain with lidocaine patches, gabapentin, flexiril and oxycodone prn - 

overall improving on this regimen.  can sit in WC now, still cannot walk.


neurosx consulted and following


maintain fall risk precautions.  


heparin bid


Code(s): R26.2 - DIFFICULTY IN WALKING, NOT ELSEWHERE CLASSIFIED   





(2) Hyperglycemia


Assessment/Plan: 


elevated serum glucose on admission, now normal, a1c at acceptable ranges.


Code(s): R73.9 - HYPERGLYCEMIA, UNSPECIFIED   





(3) Neuropathy


Assessment/Plan: 


on gabapentin, pain management


Code(s): G62.9 - POLYNEUROPATHY, UNSPECIFIED   





(4) Leukocytosis


Assessment/Plan: 


leukocytosis resolved.


monitor


Code(s): D72.829 - ELEVATED WHITE BLOOD CELL COUNT, UNSPECIFIED   





(5) Prophylactic measure


Assessment/Plan: 


fen


tolerates po


monitor electrolytes





full code





heparin bid








Code(s): Z29.9 - ENCOUNTER FOR PROPHYLACTIC MEASURES, UNSPECIFIED   





Visit type





- Emergency Visit


Emergency Visit: Yes


ED Registration Date: 10/17/19


Care time: The patient presented to the Emergency Department on the above date 

and was hospitalized for further evaluation of their emergent condition.





- New Patient


This patient is new to me today: No





- Critical Care


Critical Care patient: No





- Discharge Referral


Referred to [facility] No

## 2019-10-21 RX ADMIN — HEPARIN SODIUM SCH UNIT: 5000 INJECTION, SOLUTION INTRAVENOUS; SUBCUTANEOUS at 10:07

## 2019-10-21 RX ADMIN — GABAPENTIN SCH MG: 100 CAPSULE ORAL at 06:01

## 2019-10-21 RX ADMIN — DOCUSATE SODIUM SCH MG: 100 CAPSULE, LIQUID FILLED ORAL at 21:38

## 2019-10-21 RX ADMIN — DOCUSATE SODIUM SCH MG: 100 CAPSULE, LIQUID FILLED ORAL at 06:01

## 2019-10-21 RX ADMIN — CYCLOBENZAPRINE HYDROCHLORIDE SCH MG: 5 TABLET, FILM COATED ORAL at 22:36

## 2019-10-21 RX ADMIN — GABAPENTIN SCH MG: 100 CAPSULE ORAL at 14:51

## 2019-10-21 RX ADMIN — CYCLOBENZAPRINE HYDROCHLORIDE SCH MG: 5 TABLET, FILM COATED ORAL at 06:01

## 2019-10-21 RX ADMIN — DOCUSATE SODIUM SCH MG: 100 CAPSULE, LIQUID FILLED ORAL at 14:51

## 2019-10-21 RX ADMIN — HEPARIN SODIUM SCH UNIT: 5000 INJECTION, SOLUTION INTRAVENOUS; SUBCUTANEOUS at 21:38

## 2019-10-21 RX ADMIN — Medication SCH EACH: at 22:37

## 2019-10-21 RX ADMIN — LIDOCAINE SCH PATCH: 50 PATCH TOPICAL at 10:06

## 2019-10-21 RX ADMIN — SODIUM CHLORIDE SCH MLS/HR: 9 INJECTION, SOLUTION INTRAVENOUS at 10:07

## 2019-10-21 RX ADMIN — POLYETHYLENE GLYCOL 3350 SCH GRAMS: 17 POWDER, FOR SOLUTION ORAL at 10:11

## 2019-10-21 RX ADMIN — CYCLOBENZAPRINE HYDROCHLORIDE SCH MG: 5 TABLET, FILM COATED ORAL at 17:24

## 2019-10-21 RX ADMIN — GABAPENTIN SCH MG: 100 CAPSULE ORAL at 21:38

## 2019-10-21 RX ADMIN — PREDNISONE SCH MG: 20 TABLET ORAL at 12:43

## 2019-10-21 NOTE — PN
Physical Exam: 


SUBJECTIVE: 


Patient seen and examined at the bedside.  Was able to ambulate today with 

physical therapy from his bed to the door per PT notes.  ambulated 15 feet, but 

reports severe pain of his left leg.   He informed me that he wants to have 

surgery to help him ambulate. 





OBJECTIVE:


Patient is a 43 year old  male with a past medical medical history of back pain

, lumbar radiculopathy and appendectomy.  he comes to the ED on 10/17/19 with 

complaints of inability to ambulate or work secondary to worsening back and 

left leg pain.  He reports that in the past he has seen by a spine specialist 

and was told that he may need surgical interventions but has not yet followed 

up.  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 

have  gradual onset and mild left low back pain.  On admission he was unable to 

ambulate.





imaging:


lumbar spine MRI: (1) at l4-l5 there is a central and bilateral disc herniation 

larger on the right deforming the sac and appears to be reaching the right L5 

root (2) at l3-l4 there is a small central and slightly left sided disc 

herniation mildly deforming the sac (3) at L5-S1 there is a small central and 

slightly right sided disc herniation.


 Vital Signs











 Period  Temp  Pulse  Resp  BP Sys/Bravo  Pulse Ox


 


 Last 24 Hr  97.8 F-98.6 F    18-20  /61-77  99





GENERAL: Awake, alert, and fully oriented, in no acute distress.


HEAD: Normal with no signs of trauma.


EYES: Pupils equal, round and reactive to light, extraocular movements intact, 

sclera anicteric, conjunctiva clear. No lid lag.


EARS, NOSE, THROAT: Ears normal, nares patent, oropharynx clear without 

exudates. Moist mucous membranes.


NECK: Normal range of motion, supple without lymphadenopathy, JVD, or masses.


LUNGS: Breath sounds equal, clear to auscultation bilaterally. No wheezes, and 

no crackles. No accessory muscle use.


HEART: Regular rate and rhythm, normal S1 and S2 without murmur, rub or gallop.


ABDOMEN: Soft, nontender, not distended, normoactive bowel sounds, no guarding, 

no rebound, no masses.  No hepatomegaly or  splenomegaly. 


MUSCULOSKELETAL: Normal range of motion at all joints. No bony deformities or 

tenderness. No CVA tenderness.


UPPER EXTREMITIES: 2+ pulses, warm, well-perfused. No cyanosis. No clubbing. 


LOWER EXTREMITIES: better movement of the left leg without significant pain.  

mild edema of left foot, no falls, no fracture on foot xray


NEUROLOGICAL:   Normal speech. poor ambulation, but slightly improving.  PT 

ordered twice daily


PSYCHIATRIC: Cooperative. Good eye contact. Appropriate mood and affect.


 


Active Medications











Generic Name Dose Route Start Last Admin





  Trade Name Freq  PRN Reason Stop Dose Admin


 


Cyclobenzaprine HCl  5 mg  10/18/19 14:00  10/21/19 06:01





  Cyclobenzaprine Hcl  PO   5 mg





  TID BRAYAN   Administration





     





     





     





     


 


Diazepam  5 mg  10/17/19 17:32  10/21/19 00:09





  Valium -  PO   5 mg





  Q24H PRN   Administration





  spasms   





     





     





     


 


Docusate Sodium  100 mg  10/17/19 22:00  10/21/19 06:01





  Colace -  PO   100 mg





  TID BRAYAN   Administration





     





     





     





     


 


Gabapentin  100 mg  10/17/19 15:45  10/21/19 06:01





  Neurontin -  PO   100 mg





  TID BRAYAN   Administration





     





     





     





     


 


Heparin Sodium (Porcine)  5,000 unit  10/17/19 22:00  10/21/19 10:07





  Heparin -  SQ   5,000 unit





  BID BRAYAN   Administration





     





     





     





     


 


Lidocaine  1 patch  10/18/19 10:00  10/21/19 10:06





  Lidoderm Patch -  TP   1 patch





  DAILY BRAYAN   Administration





     





     





     





     


 


Miscellaneous  1 each  10/18/19 22:00  10/20/19 23:37





  Lidoderm Patch Removal  MC   1 each





  DAILY@2200 BRAYAN   Administration





     





     





     





     


 


Oxycodone HCl  10 mg  10/18/19 09:21  10/21/19 04:29





  Roxicodone -  PO   10 mg





  Q4H PRN   Administration





  PAIN LEVEL 7 - 10   





     





     





     


 


Polyethylene Glycol  17 gm  10/17/19 16:15  10/21/19 10:11





  Miralax (For Daily Use) -  PO   17 grams





  DAILY BRAYAN   Administration





     





     





     





     


 


Prednisone  40 mg  10/21/19 11:30  





  Deltasone -  PO  10/24/19 10:01  





  DAILY BRAYAN   





     





     





     





     











ASSESSMENT/PLAN:








Problem List





- Problems


(1) Ambulatory dysfunction


Assessment/Plan: 


Lumbar X ray without acute fracture.  


Lumbar spine with multiple disc herniations seen on imaging.  see above. 


physical therapy following and patient was able to ambulate 15 feet today.  


manage pain with lidocaine patches, gabapentin, flexiril and oxycodone prn - 

overall improving on this regimen.  will give a trial of prednisone 40mg daily 

x 4 days and assess for improvement. 


maintain fall risk precautions.  


Neurosurgery following, further plans per neurosurgery


heparin bid


Code(s): R26.2 - DIFFICULTY IN WALKING, NOT ELSEWHERE CLASSIFIED   





(2) Hyperglycemia


Assessment/Plan: 


elevated serum glucose on admission, now normal, a1c at acceptable ranges.


Code(s): R73.9 - HYPERGLYCEMIA, UNSPECIFIED   





(3) Neuropathy


Assessment/Plan: 


on gabapentin, pain management


Code(s): G62.9 - POLYNEUROPATHY, UNSPECIFIED   





(4) Leukocytosis


Assessment/Plan: 


leukocytosis resolved.


monitor


Code(s): D72.829 - ELEVATED WHITE BLOOD CELL COUNT, UNSPECIFIED   





(5) Prophylactic measure


Assessment/Plan: 


fen


tolerates po


monitor electrolytes





full code





heparin bid








Code(s): Z29.9 - ENCOUNTER FOR PROPHYLACTIC MEASURES, UNSPECIFIED   





Visit type





- Emergency Visit


Emergency Visit: Yes


ED Registration Date: 10/17/19


Care time: The patient presented to the Emergency Department on the above date 

and was hospitalized for further evaluation of their emergent condition.





- New Patient


This patient is new to me today: No





- Critical Care


Critical Care patient: No





- Discharge Referral


Referred to [facility] No

## 2019-10-22 LAB
ALBUMIN SERPL-MCNC: 3.8 G/DL (ref 3.4–5)
ALP SERPL-CCNC: 86 U/L (ref 45–117)
ALT SERPL-CCNC: 121 U/L (ref 13–61)
ANION GAP SERPL CALC-SCNC: 11 MMOL/L (ref 8–16)
AST SERPL-CCNC: 40 U/L (ref 15–37)
BASOPHILS # BLD: 0.2 % (ref 0–2)
BILIRUB SERPL-MCNC: 1.2 MG/DL (ref 0.2–1)
BUN SERPL-MCNC: 18.7 MG/DL (ref 7–18)
CALCIUM SERPL-MCNC: 9.9 MG/DL (ref 8.5–10.1)
CHLORIDE SERPL-SCNC: 98 MMOL/L (ref 98–107)
CO2 SERPL-SCNC: 29 MMOL/L (ref 21–32)
CREAT SERPL-MCNC: 1.1 MG/DL (ref 0.55–1.3)
DEPRECATED RDW RBC AUTO: 13.2 % (ref 11.9–15.9)
EOSINOPHIL # BLD: 1.5 % (ref 0–4.5)
GLUCOSE SERPL-MCNC: 132 MG/DL (ref 74–106)
HCT VFR BLD CALC: 44.7 % (ref 35.4–49)
HGB BLD-MCNC: 15.3 GM/DL (ref 11.7–16.9)
LYMPHOCYTES # BLD: 25.9 % (ref 8–40)
MAGNESIUM SERPL-MCNC: 2.4 MG/DL (ref 1.8–2.4)
MCH RBC QN AUTO: 30.2 PG (ref 25.7–33.7)
MCHC RBC AUTO-ENTMCNC: 34.1 G/DL (ref 32–35.9)
MCV RBC: 88.7 FL (ref 80–96)
MONOCYTES # BLD AUTO: 7.2 % (ref 3.8–10.2)
NEUTROPHILS # BLD: 65.2 % (ref 42.8–82.8)
PLATELET # BLD AUTO: 265 K/MM3 (ref 134–434)
PMV BLD: 8.5 FL (ref 7.5–11.1)
POTASSIUM SERPLBLD-SCNC: 4.1 MMOL/L (ref 3.5–5.1)
PROT SERPL-MCNC: 7.7 G/DL (ref 6.4–8.2)
RBC # BLD AUTO: 5.04 M/MM3 (ref 4–5.6)
SODIUM SERPL-SCNC: 137 MMOL/L (ref 136–145)
WBC # BLD AUTO: 9 K/MM3 (ref 4–10)

## 2019-10-22 RX ADMIN — PREDNISONE SCH MG: 20 TABLET ORAL at 10:32

## 2019-10-22 RX ADMIN — DOCUSATE SODIUM SCH MG: 100 CAPSULE, LIQUID FILLED ORAL at 21:46

## 2019-10-22 RX ADMIN — Medication SCH EACH: at 22:07

## 2019-10-22 RX ADMIN — GABAPENTIN SCH MG: 100 CAPSULE ORAL at 06:12

## 2019-10-22 RX ADMIN — POLYETHYLENE GLYCOL 3350 SCH GRAMS: 17 POWDER, FOR SOLUTION ORAL at 10:32

## 2019-10-22 RX ADMIN — LIDOCAINE SCH PATCH: 50 PATCH TOPICAL at 10:31

## 2019-10-22 RX ADMIN — CYCLOBENZAPRINE HYDROCHLORIDE SCH MG: 5 TABLET, FILM COATED ORAL at 17:38

## 2019-10-22 RX ADMIN — HEPARIN SODIUM SCH UNIT: 5000 INJECTION, SOLUTION INTRAVENOUS; SUBCUTANEOUS at 21:46

## 2019-10-22 RX ADMIN — CYCLOBENZAPRINE HYDROCHLORIDE SCH MG: 5 TABLET, FILM COATED ORAL at 06:12

## 2019-10-22 RX ADMIN — DOCUSATE SODIUM SCH MG: 100 CAPSULE, LIQUID FILLED ORAL at 14:53

## 2019-10-22 RX ADMIN — DOCUSATE SODIUM SCH MG: 100 CAPSULE, LIQUID FILLED ORAL at 06:11

## 2019-10-22 RX ADMIN — GABAPENTIN SCH MG: 100 CAPSULE ORAL at 14:53

## 2019-10-22 RX ADMIN — CYCLOBENZAPRINE HYDROCHLORIDE SCH MG: 5 TABLET, FILM COATED ORAL at 21:46

## 2019-10-22 RX ADMIN — GABAPENTIN SCH MG: 100 CAPSULE ORAL at 21:46

## 2019-10-22 RX ADMIN — HEPARIN SODIUM SCH UNIT: 5000 INJECTION, SOLUTION INTRAVENOUS; SUBCUTANEOUS at 10:32

## 2019-10-22 NOTE — PN
Progress Note, Physician


Chief Complaint: 





states he is walking better and with less pain


History of Present Illness: 





Patient is a 43 year old  male with a past medical medical history of back pain

, lumbar radiculopathy and appendectomy.  he comes to the ED on 10/17/19 with 

complaints of inability to ambulate or work secondary to worsening back and 

left leg pain.  He reports that in the past he has seen by a spine specialist 

and was told that he may need surgical interventions but has not yet followed 

up.  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 

have  gradual onset and mild left low back pain.  On admission he was unable to 

ambulate.





- Current Medication List


Current Medications: 


Active Medications





Cyclobenzaprine HCl (Cyclobenzaprine Hcl)  5 mg PO TID [facility]


   Last Admin: 10/22/19 06:12 Dose:  5 mg


Diazepam (Valium -)  5 mg PO Q24H PRN


   PRN Reason: spasms


   Last Admin: 10/21/19 00:09 Dose:  5 mg


Docusate Sodium (Colace -)  100 mg PO TID [facility]


   Last Admin: 10/22/19 06:11 Dose:  100 mg


Gabapentin (Neurontin -)  100 mg PO TID [facility]


   Last Admin: 10/22/19 06:12 Dose:  100 mg


Heparin Sodium (Porcine) (Heparin -)  5,000 unit SQ BID [facility]


   Last Admin: 10/21/19 21:38 Dose:  5,000 unit


Lidocaine (Lidoderm Patch -)  1 patch TP DAILY [facility]


   Last Admin: 10/21/19 10:06 Dose:  1 patch


Miscellaneous (Lidoderm Patch Removal)  1 each MC DAILY@2200 [facility]


   Last Admin: 10/21/19 22:37 Dose:  1 each


Oxycodone HCl (Roxicodone -)  10 mg PO Q4H PRN


   PRN Reason: PAIN LEVEL 7 - 10


   Last Admin: 10/22/19 06:10 Dose:  10 mg


Polyethylene Glycol (Miralax (For Daily Use) -)  17 gm PO DAILY [facility]


   Last Admin: 10/21/19 10:11 Dose:  17 grams


Prednisone (Deltasone -)  40 mg PO DAILY [facility]


   Stop: 10/24/19 10:01


   Last Admin: 10/21/19 12:43 Dose:  40 mg











- Objective


Vital Signs: 


 Vital Signs











Temperature  98.4 F   10/22/19 06:00


 


Pulse Rate  95 H  10/22/19 06:00


 


Respiratory Rate  20   10/22/19 06:00


 


Blood Pressure  109/69   10/22/19 06:00


 


O2 Sat by Pulse Oximetry (%)  99   10/20/19 21:00











Constitutional: Yes: Well Nourished, No Distress, Calm


Eyes: Yes: WNL, Conjunctiva Clear


HENT: Yes: WNL, Atraumatic, Normocephalic


Neck: Yes: WNL, Supple, Trachea Midline


Cardiovascular: Yes: WNL, Regular Rate and Rhythm


Respiratory: Yes: WNL, Regular, CTA Bilaterally


Gastrointestinal: Yes: WNL, Normal Bowel Sounds


...Rectal Exam: Yes: Deferred


Genitourinary: Yes: WNL


Breast(s): Yes: WNL


Musculoskeletal: Yes: Back Pain


Extremities: Yes: WNL


Edema: No


Peripheral Pulses WNL: Yes


Peripheral Pulses: Left Radial: 2+, Right Radial: 2+, Left Doralis Pedis: 2+, 

Right Dorsalis Pedis: 2+, Left Femoral: 2+, Right Femoral: 2+


Integumentary: Yes: WNL


Neurological: Yes: WNL, Alert, Oriented, Weakness (secondary to pain)


...Motor Strength: WNL


Psychiatric: Yes: WNL


Labs: 


 CBC, BMP





 10/20/19 08:40 





 10/20/19 08:40 











- ....Imaging


MRI: Report Reviewed (lumbar spine MRI: (1) at l4-l5 there is a central and 

bilateral disc herniation larger on the right deforming the sac and appears to 

be reaching the right L5 root (2) at l3-l4 there is a small central and 

slightly left sided disc herniation mildly deforming the sac (3) at L5-S1 there 

is a small central and slightly right sided disc herniation.)





Problem List





- Problems


(1) Lumbar radiculopathy


Assessment/Plan: 


Lumbar X ray without acute fracture.  


Lumbar spine with multiple disc herniations seen on imaging.  see above. 


physical therapy following and patient was able to ambulate 15 feet today.  


manage pain with lidocaine patches, gabapentin, flexiril and oxycodone prn 


c/w prednisone 40mg daily x 4 days and assess for improvement. 


maintain fall risk precautions.  


Neurosurgery following


Code(s): M54.16 - RADICULOPATHY, LUMBAR REGION   





(2) Leukocytosis


Assessment/Plan: 


resolved


Code(s): D72.829 - ELEVATED WHITE BLOOD CELL COUNT, UNSPECIFIED   





(3) Neuropathy


Assessment/Plan: 


on gabapentin, pain management


Code(s): G62.9 - POLYNEUROPATHY, UNSPECIFIED   





(4) Prophylactic measure


Assessment/Plan: 


FEN


tolerates po


monitor electrolytes





DVT


heparin bid





Dispo


maintain as inpatient


full code


discharge planning


Code(s): Z29.9 - ENCOUNTER FOR PROPHYLACTIC MEASURES, UNSPECIFIED   





Visit type





- Emergency Visit


Emergency Visit: Yes


ED Registration Date: 10/17/19


Care time: The patient presented to the Emergency Department on the above date 

and was hospitalized for further evaluation of their emergent condition.





- New Patient


This patient is new to me today: Yes


Date on this admission: 10/23/19





- Critical Care


Critical Care patient: No





- Discharge Referral


Referred to [facility] No

## 2019-10-23 VITALS — HEART RATE: 98 BPM | DIASTOLIC BLOOD PRESSURE: 71 MMHG | SYSTOLIC BLOOD PRESSURE: 142 MMHG | TEMPERATURE: 97.5 F

## 2019-10-23 LAB
ALBUMIN SERPL-MCNC: 3.6 G/DL (ref 3.4–5)
ALP SERPL-CCNC: 73 U/L (ref 45–117)
ALT SERPL-CCNC: 121 U/L (ref 13–61)
ANION GAP SERPL CALC-SCNC: 7 MMOL/L (ref 8–16)
AST SERPL-CCNC: 36 U/L (ref 15–37)
BASOPHILS # BLD: 0.3 % (ref 0–2)
BILIRUB SERPL-MCNC: 0.8 MG/DL (ref 0.2–1)
BUN SERPL-MCNC: 16.2 MG/DL (ref 7–18)
CALCIUM SERPL-MCNC: 9.1 MG/DL (ref 8.5–10.1)
CHLORIDE SERPL-SCNC: 102 MMOL/L (ref 98–107)
CO2 SERPL-SCNC: 28 MMOL/L (ref 21–32)
CREAT SERPL-MCNC: 0.9 MG/DL (ref 0.55–1.3)
DEPRECATED RDW RBC AUTO: 13.2 % (ref 11.9–15.9)
EOSINOPHIL # BLD: 1.3 % (ref 0–4.5)
GLUCOSE SERPL-MCNC: 98 MG/DL (ref 74–106)
HCT VFR BLD CALC: 41.2 % (ref 35.4–49)
HGB BLD-MCNC: 14.2 GM/DL (ref 11.7–16.9)
LYMPHOCYTES # BLD: 31.6 % (ref 8–40)
MAGNESIUM SERPL-MCNC: 2.3 MG/DL (ref 1.8–2.4)
MCH RBC QN AUTO: 30.4 PG (ref 25.7–33.7)
MCHC RBC AUTO-ENTMCNC: 34.4 G/DL (ref 32–35.9)
MCV RBC: 88.4 FL (ref 80–96)
MONOCYTES # BLD AUTO: 6.9 % (ref 3.8–10.2)
NEUTROPHILS # BLD: 59.9 % (ref 42.8–82.8)
PLATELET # BLD AUTO: 249 K/MM3 (ref 134–434)
PMV BLD: 8.6 FL (ref 7.5–11.1)
POTASSIUM SERPLBLD-SCNC: 4.2 MMOL/L (ref 3.5–5.1)
PROT SERPL-MCNC: 7.3 G/DL (ref 6.4–8.2)
RBC # BLD AUTO: 4.66 M/MM3 (ref 4–5.6)
SODIUM SERPL-SCNC: 137 MMOL/L (ref 136–145)
WBC # BLD AUTO: 10 K/MM3 (ref 4–10)

## 2019-10-23 RX ADMIN — DOCUSATE SODIUM SCH MG: 100 CAPSULE, LIQUID FILLED ORAL at 14:16

## 2019-10-23 RX ADMIN — HEPARIN SODIUM SCH UNIT: 5000 INJECTION, SOLUTION INTRAVENOUS; SUBCUTANEOUS at 09:25

## 2019-10-23 RX ADMIN — PREDNISONE SCH MG: 20 TABLET ORAL at 09:25

## 2019-10-23 RX ADMIN — GABAPENTIN SCH MG: 100 CAPSULE ORAL at 06:34

## 2019-10-23 RX ADMIN — LIDOCAINE SCH PATCH: 50 PATCH TOPICAL at 09:25

## 2019-10-23 RX ADMIN — DOCUSATE SODIUM SCH MG: 100 CAPSULE, LIQUID FILLED ORAL at 06:34

## 2019-10-23 RX ADMIN — POLYETHYLENE GLYCOL 3350 SCH GRAMS: 17 POWDER, FOR SOLUTION ORAL at 09:37

## 2019-10-23 RX ADMIN — CYCLOBENZAPRINE HYDROCHLORIDE SCH MG: 5 TABLET, FILM COATED ORAL at 14:16

## 2019-10-23 RX ADMIN — CYCLOBENZAPRINE HYDROCHLORIDE SCH MG: 5 TABLET, FILM COATED ORAL at 06:34

## 2019-10-23 RX ADMIN — GABAPENTIN SCH MG: 100 CAPSULE ORAL at 14:16

## 2019-10-23 NOTE — PN
Progress Note, Physician





- Current Medication List


Current Medications: 


Active Medications





Cyclobenzaprine HCl (Cyclobenzaprine Hcl)  5 mg PO TID [facility]


   Last Admin: 10/23/19 06:34 Dose:  5 mg


Diazepam (Valium -)  5 mg PO Q24H PRN


   PRN Reason: spasms


   Last Admin: 10/21/19 00:09 Dose:  5 mg


Docusate Sodium (Colace -)  100 mg PO TID [facility]


   Last Admin: 10/23/19 06:34 Dose:  100 mg


Gabapentin (Neurontin -)  100 mg PO TID [facility]


   Last Admin: 10/23/19 06:34 Dose:  100 mg


Heparin Sodium (Porcine) (Heparin -)  5,000 unit SQ BID [facility]


   Last Admin: 10/22/19 21:46 Dose:  5,000 unit


Lidocaine (Lidoderm Patch -)  1 patch TP DAILY [facility]


   Last Admin: 10/22/19 10:31 Dose:  1 patch


Miscellaneous (Lidoderm Patch Removal)  1 each MC DAILY@2200 [facility]


   Last Admin: 10/22/19 22:07 Dose:  1 each


Oxycodone HCl (Roxicodone -)  10 mg PO Q4H PRN


   PRN Reason: PAIN LEVEL 7 - 10


   Last Admin: 10/22/19 21:55 Dose:  10 mg


Polyethylene Glycol (Miralax (For Daily Use) -)  17 gm PO DAILY [facility]


   Last Admin: 10/22/19 10:32 Dose:  17 grams


Prednisone (Deltasone -)  40 mg PO DAILY [facility]


   Stop: 10/24/19 10:01


   Last Admin: 10/22/19 10:32 Dose:  40 mg











- Objective


Vital Signs: 


 Vital Signs











Temperature  98.4 F   10/23/19 06:00


 


Pulse Rate  89   10/23/19 06:00


 


Respiratory Rate  20   10/23/19 06:00


 


Blood Pressure  110/55 L  10/23/19 06:00


 


O2 Sat by Pulse Oximetry (%)  100   10/22/19 21:00











Labs: 


 CBC, BMP





 10/22/19 10:00 





 10/22/19 10:00 











Problem List





- Problems


(1) Lumbar radiculopathy


Code(s): M54.16 - RADICULOPATHY, LUMBAR REGION   





(2) Leukocytosis


Code(s): D72.829 - ELEVATED WHITE BLOOD CELL COUNT, UNSPECIFIED   





(3) Neuropathy


Code(s): G62.9 - POLYNEUROPATHY, UNSPECIFIED   





(4) Prophylactic measure


Code(s): Z29.9 - ENCOUNTER FOR PROPHYLACTIC MEASURES, UNSPECIFIED

## 2019-10-23 NOTE — DS
Physical Exam: 


SUBJECTIVE: Patient seen and examined








OBJECTIVE:





 Vital Signs











 Period  Temp  Pulse  Resp  BP Sys/Bravo  Pulse Ox


 


 Last 24 Hr  97.4 F-98.6 F    20-20  110-141/55-91  100








PHYSICAL EXAM





GENERAL: The patient is awake, alert, and fully oriented, in no acute distress.


HEAD: Normal with no signs of trauma.


EYES: PERRL, extraocular movements intact, sclera anicteric, conjunctiva clear. 


ENT: Ears normal, nares patent, oropharynx clear without exudates, moist mucous 

membranes.


NECK: Trachea midline, full range of motion, supple. 


LUNGS: Breath sounds equal, clear to auscultation bilaterally, no wheezes, no 

crackles, no accessory muscle use. 


HEART: Regular rate and rhythm, S1, S2 without murmur, rub or gallop.


ABDOMEN: Soft, nontender, nondistended, normoactive bowel sounds, no guarding, 

no rebound, no hepatosplenomegaly, no masses.


EXTREMITIES: 2+ pulses, warm, well-perfused, no edema. 


NEUROLOGICAL: Cranial nerves II through XII grossly intact. Normal speech, gait 

not observed.


PSYCH: Normal mood, normal affect.


SKIN: Warm, dry, normal turgor, no rashes or lesions noted.





LABS


 Laboratory Results - last 24 hr











  10/23/19 10/23/19





  08:30 08:30


 


WBC  10.0 


 


RBC  4.66 


 


Hgb  14.2 


 


Hct  41.2 


 


MCV  88.4 


 


MCH  30.4 


 


MCHC  34.4 


 


RDW  13.2 


 


Plt Count  249 


 


MPV  8.6 


 


Absolute Neuts (auto)  6.0 


 


Neutrophils %  59.9 


 


Lymphocytes %  31.6  D 


 


Monocytes %  6.9 


 


Eosinophils %  1.3 


 


Basophils %  0.3 


 


Nucleated RBC %  0 


 


Sodium   137


 


Potassium   4.2


 


Chloride   102


 


Carbon Dioxide   28


 


Anion Gap   7 L


 


BUN   16.2


 


Creatinine   0.9


 


Est GFR (CKD-EPI)AfAm   120.81


 


Est GFR (CKD-EPI)NonAf   104.24


 


Random Glucose   98


 


Calcium   9.1


 


Magnesium   2.3


 


Total Bilirubin   0.8


 


AST   36


 


ALT   121 H


 


Alkaline Phosphatase   73


 


Total Protein   7.3


 


Albumin   3.6











HOSPITAL COURSE:





Date of Admission:10/17/19





Date of Discharge: 10/23/19











Minutes to complete discharge: 35





Discharge Summary


Problems reviewed: Yes


Reason For Visit: BACK PAIN


Current Active Problems





Ambulatory dysfunction (Acute)


Hyperglycemia (Acute)


Leukocytosis (Acute)


Lumbar radiculopathy (Acute)


Neuropathy (Acute)


Prophylactic measure (Acute)











- Instructions


Diet, Activity, Other Instructions: 


You were admitted for lower back pain. You do not need surgery or an epidural 

injection. Sitting will make the pain worse. Walking will make it better. Light 

exercise until pain is better. Take the following medications:


oxydocone 10mg every 6 hours as needed for 1 week


flexeril 5mg three times a day as needed x 1 week


neurontin 100mg three times a day x 1 week


prednisone 40mg tomorrow


20mg for 3 days


10mg for 3 days


and then no more. 


Lidoderm patches 1 a day  for 1 week





Follow with your doctor if the pain is still after 1 week if the pain is still 

present.


Referrals: 


Mark Beauchamp MD, [facility] [Staff Physician] - 1 Week (call for appointment if 

pain in no better or follow with your home doctor)


Disposition: HOME





- Home Medications


Comprehensive Discharge Medication List: 


Ambulatory Orders





Cyclobenzaprine HCl 5 mg PO TID #30 tablet 10/23/19 


Docusate Sodium [Colace -] 100 mg PO TID  capsule 10/23/19 


Gabapentin [Neurontin -] 100 mg PO TID #30 capsule 10/23/19 


Lidocaine 5% Patch [Lidoderm -] 1 patch TP DAILY #7 patch 10/23/19 


oxyCODONE HCL [Roxicodone -] 10 mg PO Q6H PRN #30 tablet MDD 40mg 10/23/19 


predniSONE [Deltasone -] 10 mg PO DAILY #14 tablet 10/23/19 











Problem List





- Problems


(1) Lumbar radiculopathy


Assessment/Plan: 


Lumbar X ray without acute fracture.  


Lumbar spine with multiple disc herniations seen on imaging.  see above. 


physical therapy following and patient was able to ambulate 15 feet today.  


manage pain with lidocaine patches, gabapentin, flexiril and oxycodone prn - 

overall improving on this regimen.  will continue prednisone 40mg with taper


maintain fall risk precautions.  


 At this time, there is no acute Neurosurgical intervention which is indicated 

or planned. This was described to the patient who was in agreement.





Code(s): M54.16 - RADICULOPATHY, LUMBAR REGION   





(2) Leukocytosis


Assessment/Plan: 


resolved


Code(s): D72.829 - ELEVATED WHITE BLOOD CELL COUNT, UNSPECIFIED   





(3) Neuropathy


Assessment/Plan: 


c/w on gabapentin, pain management


Code(s): G62.9 - POLYNEUROPATHY, UNSPECIFIED   





(4) Prophylactic measure


Code(s): Z29.9 - ENCOUNTER FOR PROPHYLACTIC MEASURES, UNSPECIFIED   


This patient is new to me today: No


Emergency Visit: Yes


ED Registration Date: 10/17/19


Care time: The patient presented to the Emergency Department on the above date 

and was hospitalized for further evaluation of their emergent condition.


Critical Care patient: No





- Discharge Referral


Referred to [facility] No